# Patient Record
Sex: FEMALE | Race: OTHER | NOT HISPANIC OR LATINO | Employment: UNEMPLOYED | URBAN - METROPOLITAN AREA
[De-identification: names, ages, dates, MRNs, and addresses within clinical notes are randomized per-mention and may not be internally consistent; named-entity substitution may affect disease eponyms.]

---

## 2023-04-20 ENCOUNTER — HOSPITAL ENCOUNTER (EMERGENCY)
Facility: HOSPITAL | Age: 63
Discharge: HOME | End: 2023-04-21
Attending: EMERGENCY MEDICINE
Payer: MEDICAID

## 2023-04-20 DIAGNOSIS — H10.9 CONJUNCTIVITIS OF BOTH EYES, UNSPECIFIED CONJUNCTIVITIS TYPE: Primary | ICD-10-CM

## 2023-04-20 PROCEDURE — 99283 EMERGENCY DEPT VISIT LOW MDM: CPT

## 2023-04-20 RX ORDER — VALSARTAN 80 MG/1
80 TABLET ORAL DAILY
COMMUNITY

## 2023-04-20 NOTE — ED PROVIDER NOTES
"Time: 7:57 PM EDT  Date of encounter:  4/20/2023  Independent Historian/Clinical History and Information was obtained by:   Family  Chief Complaint: Eye drainage bilaterally    History is limited by: Language Barrier    History of Present Illness:  Patient is a 63 y.o. year old female who presents to the emergency department for evaluation of bilateral clear drainage along with edema of the lower eyelids, family member stated that it started an hour ago.  Denies vision changes.  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      HPI    Patient Care Team  Primary Care Provider: Provider, No Known    Past Medical History:     No Known Allergies  Past Medical History:   Diagnosis Date   • Hypertension      History reviewed. No pertinent surgical history.  History reviewed. No pertinent family history.    Home Medications:  Prior to Admission medications    Not on File        Social History:          Review of Systems:  Review of Systems   Eyes: Positive for pain and discharge.        Physical Exam:  /73   Pulse 72   Temp 98.6 °F (37 °C) (Oral)   Resp 18   Ht 154.9 cm (61\")   Wt 81.1 kg (178 lb 12.7 oz)   SpO2 100%   BMI 33.78 kg/m²     Physical Exam  HENT:      Head: Normocephalic.      Mouth/Throat:      Mouth: Mucous membranes are moist.   Eyes:      Pupils: Pupils are equal, round, and reactive to light.      Comments: Mild bilateral conjunctival injection.  Mild left-sided periorbital edema.  Extraocular movements are intact and nonpainful.  No proptosis.  Clear anterior chamber.   Pulmonary:      Effort: Pulmonary effort is normal.   Abdominal:      General: There is no distension.   Musculoskeletal:      Cervical back: Neck supple.   Skin:     General: Skin is warm and dry.   Neurological:      General: No focal deficit present.      Mental Status: She is alert and oriented to person, place, and time.   Psychiatric:         Mood and Affect: Mood normal.         Behavior: Behavior normal.            "       Procedures:  Procedures      Medical Decision Making:      Comorbidities that affect care:    Hypertension    External Notes reviewed:    None      The following orders were placed and all results were independently analyzed by me:  Orders Placed This Encounter   Procedures   • Visual acuity screening       Medications Given in the Emergency Department:  Medications   erythromycin (ROMYCIN) ophthalmic ointment 1 application (1 application Both Eyes Given 4/21/23 0352)        ED Course:    ED Course as of 04/21/23 0725   Thu Apr 20, 2023 2000 --- PROVIDER IN TRIAGE NOTE ---    The patient was evaluated by meGustavo in triage. Orders were placed and the patient is currently awaiting disposition.    [AJ]      ED Course User Index  [AJ] Gustavo Carrillo PA-C       Labs:    Lab Results (last 24 hours)     ** No results found for the last 24 hours. **           Imaging:    No Radiology Exams Resulted Within Past 24 Hours      Differential Diagnosis and Discussion:    Eye Pain/Blurred Vision: Differential diagnosis includes but is not limited to dacryocystitis, hordeolum, chalazion, periorbital cellulitis, cavernous sinus thrombosis, blepharitis, and glaucoma.        [facility]         Patient Care Considerations:    NARCOTICS: I considered prescribing opiate pain medication as an outpatient, however No pain control required in the emergency department.      Consultants/Shared Management Plan:    None    Social Determinants of Health:    Patient has presented with family members who are responsible, reliable and will ensure follow up care.      Disposition and Care Coordination:    Discharged: The patient is suitable and stable for discharge with no need for consideration of observation or admission.        Final diagnoses:   Conjunctivitis of both eyes, unspecified conjunctivitis type        ED Disposition     ED Disposition   Discharge    Condition   Stable    Comment   --             This medical record  created using voice recognition software.           Bryan Crump MD  04/21/23 3123

## 2023-04-21 VITALS
BODY MASS INDEX: 33.76 KG/M2 | DIASTOLIC BLOOD PRESSURE: 73 MMHG | OXYGEN SATURATION: 100 % | WEIGHT: 178.79 LBS | RESPIRATION RATE: 18 BRPM | HEART RATE: 72 BPM | SYSTOLIC BLOOD PRESSURE: 135 MMHG | TEMPERATURE: 98.6 F | HEIGHT: 61 IN

## 2023-04-21 RX ORDER — ERYTHROMYCIN 5 MG/G
1 OINTMENT OPHTHALMIC ONCE
Status: COMPLETED | OUTPATIENT
Start: 2023-04-21 | End: 2023-04-21

## 2023-04-21 RX ADMIN — ERYTHROMYCIN 1 APPLICATION: 5 OINTMENT OPHTHALMIC at 03:52

## 2023-04-21 NOTE — ED NOTES
Attempted to do the visual acuity with . Pt. Stated that she is unable to read that chart because her vision is blurry. Pt. Stated that her right eye is worse than her left.

## 2023-09-08 ENCOUNTER — OFFICE VISIT (OUTPATIENT)
Dept: ORTHOPEDIC SURGERY | Facility: CLINIC | Age: 63
End: 2023-09-08

## 2023-09-08 ENCOUNTER — PREP FOR SURGERY (OUTPATIENT)
Dept: OTHER | Facility: HOSPITAL | Age: 63
End: 2023-09-08

## 2023-09-08 VITALS
WEIGHT: 178 LBS | DIASTOLIC BLOOD PRESSURE: 103 MMHG | HEART RATE: 68 BPM | OXYGEN SATURATION: 95 % | SYSTOLIC BLOOD PRESSURE: 191 MMHG | BODY MASS INDEX: 33.61 KG/M2 | HEIGHT: 61 IN

## 2023-09-08 DIAGNOSIS — M17.0 BILATERAL PRIMARY OSTEOARTHRITIS OF KNEE: ICD-10-CM

## 2023-09-08 DIAGNOSIS — M25.561 PAIN IN BOTH KNEES, UNSPECIFIED CHRONICITY: Primary | ICD-10-CM

## 2023-09-08 DIAGNOSIS — M17.0 BILATERAL PRIMARY OSTEOARTHRITIS OF KNEE: Primary | ICD-10-CM

## 2023-09-08 DIAGNOSIS — M25.562 PAIN IN BOTH KNEES, UNSPECIFIED CHRONICITY: Primary | ICD-10-CM

## 2023-09-08 PROBLEM — M17.11 OSTEOARTHRITIS OF RIGHT KNEE, UNSPECIFIED OSTEOARTHRITIS TYPE: Status: ACTIVE | Noted: 2023-09-08

## 2023-09-08 RX ORDER — PREDNISOLONE ACETATE 10 MG/ML
SUSPENSION/ DROPS OPHTHALMIC
COMMUNITY
Start: 2023-05-12

## 2023-09-08 RX ORDER — CEFAZOLIN SODIUM IN 0.9 % NACL 3 G/100 ML
3 INTRAVENOUS SOLUTION, PIGGYBACK (ML) INTRAVENOUS ONCE
OUTPATIENT
Start: 2023-09-08 | End: 2023-09-08

## 2023-09-08 RX ORDER — CEFAZOLIN SODIUM 2 G/100ML
2 INJECTION, SOLUTION INTRAVENOUS ONCE
OUTPATIENT
Start: 2023-09-08 | End: 2023-09-08

## 2023-09-08 RX ORDER — TRANEXAMIC ACID 10 MG/ML
1000 INJECTION, SOLUTION INTRAVENOUS ONCE
OUTPATIENT
Start: 2023-09-08 | End: 2023-09-08

## 2023-09-08 NOTE — PROGRESS NOTES
"Chief Complaint  Initial Evaluation of the Left Knee and Initial Evaluation of the Right Knee     Subjective      Danny Rogers presents to [facility] for evaluation of the bilateral knees. The patient reports bilateral knee pain. She reports bilateral knee pain for about 6 months. She reports the right knee is slightly worse than the left knee. She has had previous injections that no longer provide relief. Last injection was 1 week ago in [place].     No Known Allergies     Social History     Socioeconomic History    Marital status: Single        I reviewed the patient's chief complaint, history of present illness, review of systems, past medical history, surgical history, family history, social history, medications, and allergy list.     Review of Systems     Constitutional: Denies fevers, chills, weight loss  Cardiovascular: Denies chest pain, shortness of breath  Skin: Denies rashes, acute skin changes  Neurologic: Denies headache, loss of consciousness  MSK: bilateral knee pain      Vital Signs:   BP (!) 191/103   Pulse 68   Ht 154.9 cm (61\")   Wt 80.7 kg (178 lb)   SpO2 95%   BMI 33.63 kg/m²          Physical Exam  General: Alert. No acute distress    Ortho Exam      Right knee- Mild laxity to varus/valgus stress. Stable to anterior/posterior drawer. Positive EHL, FHL, GS and TA. Sensation intact to all 5 nerves of the foot. Positive pulses. Negative Lachman's. Negative Fabien's. Knee Extensor Mechanism  intact. ROM -10 to 115 degrees. Tender to palpation. Medial joint line tenderness. Neurovascularly intact.     Left knee- Mild laxity to varus/valgus stress. Stable to anterior/posterior drawer. Positive EHL, FHL, GS and TA. Sensation intact to all 5 nerves of the foot. Positive pulses. Negative Lachman's. Negative Fabien's. Knee Extensor Mechanism  intact. ROM -10 to 110 degrees. Medial joint line tenderness. No effusion    Procedures    X-Ray Report:  Right knee " X-Ray  Indication: Evaluation of right knee pain  AP/Lateral, Standing, and [place] view(s)  Findings: advanced tricompartmental  degenerative changes. Varus deformity. Lateral tibial subluxation. No acute fracture.   Prior studies available for comparison: no     X-Ray Report:  Left knee X-Ray  Indication: Evaluation of left knee pain  AP/Lateral, Standing, and [place] view(s)  Findings: advanced tricompartmental  degenerative changes. Varus deformity. Lateral tibial subluxation. No acute fracture  Prior studies available for comparison: no       Imaging Results (Most Recent)       Procedure Component Value Units Date/Time    XR Knee 3 View Right [[id number]] Resulted: 09/08/23 0825     Updated: 09/08/23 0834    XR Knee 3 View Left [[id number]] Resulted: 09/08/23 0825     Updated: 09/08/23 0834             Result Review :       No results found.           Assessment and Plan     Diagnoses and all orders for this visit:    1. Pain in both knees, unspecified chronicity (Primary)  -     XR Knee 3 View Right  -     XR Knee 3 View Left    2. Bilateral primary osteoarthritis of knee        Discussed the treatment options with the patient, operative vs non-operative.  I reviewed the x-rays that were obtained today with the patient. The patient has advanced osteoarthritis bilaterally. Discussed the risks and benefits of Right Total Knee Arthroplasty with [employer]. The patient expressed understanding and wished to proceed.     Discussed surgery., Risks/benefits discussed with patient including, but not limited to: infection, bleeding, neurovascular damage, malunion, nonunion, aesthetic deformity, need for further surgery, and death., Discussed with patient the implant type being used during surgery and patient understands., Surgery pamphlet given., Call or return if worsening symptoms., and DME order for a 3 in 1 given today due to patient will be confined to one room/level of the home that does not offer a toilet during  postop recovery.     Follow Up     2 weeks postoperatively.        Patient was given instructions and counseling regarding her condition or for health maintenance advice. Please see specific information pulled into the AVS if appropriate.     Scribed for Enrico Land MD by Akilah Lucas.  09/08/23   08:22 EDT    I have personally performed the services described in this document as scribed by the above individual and it is both accurate and complete. Enrico Land MD 09/10/23

## 2024-01-05 ENCOUNTER — OFFICE VISIT (OUTPATIENT)
Dept: ORTHOPEDIC SURGERY | Facility: CLINIC | Age: 64
End: 2024-01-05

## 2024-01-05 VITALS — DIASTOLIC BLOOD PRESSURE: 88 MMHG | OXYGEN SATURATION: 97 % | SYSTOLIC BLOOD PRESSURE: 159 MMHG | HEART RATE: 77 BPM

## 2024-01-05 DIAGNOSIS — M17.0 BILATERAL PRIMARY OSTEOARTHRITIS OF KNEE: Primary | ICD-10-CM

## 2024-01-05 RX ORDER — IBUPROFEN 200 MG
200 TABLET ORAL EVERY 6 HOURS PRN
COMMUNITY

## 2024-01-05 RX ADMIN — LIDOCAINE HYDROCHLORIDE 5 ML: 10 INJECTION, SOLUTION INFILTRATION; PERINEURAL at 09:34

## 2024-01-05 RX ADMIN — TRIAMCINOLONE ACETONIDE 40 MG: 40 INJECTION, SUSPENSION INTRA-ARTICULAR; INTRAMUSCULAR at 09:34

## 2024-01-05 NOTE — PROGRESS NOTES
Chief Complaint  Follow-up of the Right Knee and Follow-up of the Left Knee     Subjective      Danny Rogers presents to [facility] for follow up evaluation of the bilateral knees. The patient has been treating his bilateral knee osteoarthritis conservatively. She was scheduled for a Right Total Knee Arthroplasty with [employer] but had to cancel.     No Known Allergies     Social History     Socioeconomic History    Marital status: Single        I reviewed the patient's chief complaint, history of present illness, review of systems, past medical history, surgical history, family history, social history, medications, and allergy list.     Review of Systems     Constitutional: Denies fevers, chills, weight loss  Cardiovascular: Denies chest pain, shortness of breath  Skin: Denies rashes, acute skin changes  Neurologic: Denies headache, loss of consciousness  MSK: bilateral knee pain      Vital Signs:   /88   Pulse 77   SpO2 97%          Physical Exam  General: Alert. No acute distress    Ortho Exam      Right knee- Mild laxity to varus/valgus stress. Stable to anterior/posterior drawer. Positive EHL, FHL, GS and TA. Sensation intact to all 5 nerves of the foot. Positive pulses. Negative Lachman's. Negative Fabien's. Knee Extensor Mechanism  intact. ROM -10 to 115 degrees. Tender to palpation. Medial joint line tenderness. Neurovascularly intact.      Left knee- Mild laxity to varus/valgus stress. Stable to anterior/posterior drawer. Positive EHL, FHL, GS and TA. Sensation intact to all 5 nerves of the foot. Positive pulses. Negative Lachman's. Negative Fabien's. Knee Extensor Mechanism  intact. ROM -10 to 110 degrees. Medial joint line tenderness. No effusion    Left knee: L knee  Date/Time: 1/5/2024 9:34 AM  Consent given by: patient  Site marked: site marked  Timeout: Immediately prior to procedure a time out was called to verify the correct patient, procedure, equipment,  support staff and site/side marked as required   Supporting Documentation  Indications: pain   Procedure Details  Location: knee - L knee  Needle gauge: 21G.  Medications administered: 5 mL lidocaine 1 %; 40 mg triamcinolone acetonide 40 MG/ML  Patient tolerance: patient tolerated the procedure well with no immediate complications            Imaging Results (Most Recent)       None             Result Review :       No results found.           Assessment and Plan     Diagnoses and all orders for this visit:    1. Bilateral primary osteoarthritis of knee (Primary)        Discussed the treatment options with the patient, operative vs non-operative. Discussed the risks and benefits of a Right Total Knee Arthroplasty with [employer]. The patient expressed understanding and wished to proceed. Discussed the risks and benefits of a left knee steroid injection. The patient expressed understanding and wished to proceed. She tolerated the injection well. Prescription for Norco given today.     Discussed surgery., Risks/benefits discussed with patient including, but not limited to: infection, bleeding, neurovascular damage, malunion, nonunion, aesthetic deformity, need for further surgery, and death., Discussed with patient the implant type being used during surgery and patient understands., Surgery pamphlet given., Call or return if worsening symptoms., and DME order for a 3 in 1 given today due to patient will be confined to one room/level of the home that does not offer a toilet during postop recovery.     Follow Up     2 weeks postoperatively.        Patient was given instructions and counseling regarding her condition or for health maintenance advice. Please see specific information pulled into the AVS if appropriate.     Scribed for Enrico Land MD by Akilah Lucas.  01/05/24   09:18 EST    I have personally performed the services described in this document as scribed by the above individual and it is both  accurate and complete. Enrico Land MD 01/06/24

## 2024-01-06 RX ORDER — TRIAMCINOLONE ACETONIDE 40 MG/ML
40 INJECTION, SUSPENSION INTRA-ARTICULAR; INTRAMUSCULAR
Status: COMPLETED | OUTPATIENT
Start: 2024-01-05 | End: 2024-01-05

## 2024-01-06 RX ORDER — LIDOCAINE HYDROCHLORIDE 10 MG/ML
5 INJECTION, SOLUTION INFILTRATION; PERINEURAL
Status: COMPLETED | OUTPATIENT
Start: 2024-01-05 | End: 2024-01-05

## 2024-01-15 ENCOUNTER — TELEPHONE (OUTPATIENT)
Dept: ORTHOPEDIC SURGERY | Facility: CLINIC | Age: 64
End: 2024-01-15

## 2024-01-15 NOTE — TELEPHONE ENCOUNTER
SPOKE WITH PATIENT, PER PITA WE ARE UNABLE TO SCHEDULE PATIENT DUE TO POST-OP CARE. PATIENT WILL CALL THE OFFICE TO SCHEDULE AN APPOINTMENT TO SEE DR. BOBO FOR POSSIBLE INJECTIONS OR REFERRAL TO ANOTHER PHYSICIAN.